# Patient Record
Sex: MALE
[De-identification: names, ages, dates, MRNs, and addresses within clinical notes are randomized per-mention and may not be internally consistent; named-entity substitution may affect disease eponyms.]

---

## 2017-01-27 ENCOUNTER — TRANSCRIPTION ENCOUNTER (OUTPATIENT)
Age: 37
End: 2017-01-27

## 2017-02-01 ENCOUNTER — TRANSCRIPTION ENCOUNTER (OUTPATIENT)
Age: 37
End: 2017-02-01

## 2021-07-02 ENCOUNTER — APPOINTMENT (OUTPATIENT)
Dept: OTOLARYNGOLOGY | Facility: CLINIC | Age: 41
End: 2021-07-02
Payer: COMMERCIAL

## 2021-07-02 VITALS
SYSTOLIC BLOOD PRESSURE: 116 MMHG | HEART RATE: 54 BPM | BODY MASS INDEX: 28.23 KG/M2 | WEIGHT: 220 LBS | DIASTOLIC BLOOD PRESSURE: 78 MMHG | HEIGHT: 74 IN

## 2021-07-02 DIAGNOSIS — Z86.39 PERSONAL HISTORY OF OTHER ENDOCRINE, NUTRITIONAL AND METABOLIC DISEASE: ICD-10-CM

## 2021-07-02 DIAGNOSIS — J34.2 DEVIATED NASAL SEPTUM: ICD-10-CM

## 2021-07-02 DIAGNOSIS — Z87.891 PERSONAL HISTORY OF NICOTINE DEPENDENCE: ICD-10-CM

## 2021-07-02 DIAGNOSIS — Z78.9 OTHER SPECIFIED HEALTH STATUS: ICD-10-CM

## 2021-07-02 DIAGNOSIS — H60.60 UNSPECIFIED CHRONIC OTITIS EXTERNA, UNSPECIFIED EAR: ICD-10-CM

## 2021-07-02 DIAGNOSIS — H93.8X2 OTHER SPECIFIED DISORDERS OF LEFT EAR: ICD-10-CM

## 2021-07-02 PROBLEM — Z00.00 ENCOUNTER FOR PREVENTIVE HEALTH EXAMINATION: Status: ACTIVE | Noted: 2021-07-02

## 2021-07-02 PROCEDURE — 99243 OFF/OP CNSLTJ NEW/EST LOW 30: CPT

## 2021-07-02 RX ORDER — ASCORBIC ACID 1000 MG
10 TABLET ORAL
Refills: 0 | Status: ACTIVE | COMMUNITY
Start: 2021-07-02

## 2021-07-02 RX ORDER — UBIDECARENONE/VIT E ACET 100MG-5
50 MCG CAPSULE ORAL
Refills: 0 | Status: ACTIVE | COMMUNITY
Start: 2021-07-02

## 2021-07-02 RX ORDER — ATORVASTATIN CALCIUM 20 MG/1
20 TABLET, FILM COATED ORAL
Refills: 0 | Status: ACTIVE | COMMUNITY

## 2021-07-02 NOTE — PHYSICAL EXAM
[Hearing Etienne Test (Tuning Fork On Forehead)] : no lateralization of tone [Midline] : trachea located in midline position [Normal] : no rashes [FreeTextEntry8] : scattered wax on posterior canal wall [FreeTextEntry9] : p [de-identified] : s [de-identified] : redness along handle of malleus [de-identified] : plug of wax inferiorly on TM removed via suction- peroxide and Cirpodex drops used  [FreeTextEntry1] : deviated septum to the right [FreeTextEntry2] : sinuses nontender to percussion

## 2021-07-02 NOTE — ASSESSMENT
[FreeTextEntry1] : Reviewed and reconciled medications, allergies, PMHx, PSHx, SocHx, FMHx. \par \par left ear clogging- some improvement after removal of wax inferiorly on left TM via suction and the use of peroxide but not completely normal\par redness along handle of malleus left ear\par \par Plan:\par Cerumen removed- peroxide and Ciprodex drops used. Let warm water run into left ear. Stop using q-tips. FU 3-4 days for audio if symptoms persist. FU 1 year before swim season for ear cleaning.

## 2021-07-02 NOTE — CONSULT LETTER
[Dear  ___] : Dear  [unfilled], [Consult Letter:] : I had the pleasure of evaluating your patient, [unfilled]. [Please see my note below.] : Please see my note below. [Consult Closing:] : Thank you very much for allowing me to participate in the care of this patient.  If you have any questions, please do not hesitate to contact me. [Sincerely,] : Sincerely, [FreeTextEntry3] : Markus Hathaway MD FACS

## 2021-07-02 NOTE — ADDENDUM
[FreeTextEntry1] : Documented by Maria Teresa Beck acting as scribe for Dr. Hathaway on 07/02/2021.\par \par All Medical record entries made by the Scribe were at my, Dr. Hathaway, direction and personally dictated by me on 07/02/2021 . I have reviewed the chart and agree that the record accurately reflects my personal performance of the history, physical exam, assessment and plan. I have also personally directed, reviewed, and agreed with the discharge instructions.

## 2021-07-02 NOTE — REVIEW OF SYSTEMS
[Sneezing] : sneezing [Seasonal Allergies] : seasonal allergies [Hearing Loss] : hearing loss [Ear Drainage] : ear drainage [Problem Snoring] : problem snoring [Negative] : Heme/Lymph

## 2021-07-02 NOTE — HISTORY OF PRESENT ILLNESS
[de-identified] : The patient presents with left ear clogging. Pt states that the left ear clogging occurred after going into the pool with his kid last week. Pt denies tinnitus. Pt admits to using q-tips occasionally. Pt continued to add that the clogging occurred last year after also going into the pool. Pt denies any ear pain.

## 2022-01-09 ENCOUNTER — TRANSCRIPTION ENCOUNTER (OUTPATIENT)
Age: 42
End: 2022-01-09

## 2024-11-30 ENCOUNTER — NON-APPOINTMENT (OUTPATIENT)
Age: 44
End: 2024-11-30